# Patient Record
Sex: MALE | Race: WHITE | URBAN - METROPOLITAN AREA
[De-identification: names, ages, dates, MRNs, and addresses within clinical notes are randomized per-mention and may not be internally consistent; named-entity substitution may affect disease eponyms.]

---

## 2017-01-17 ENCOUNTER — OFFICE VISIT (OUTPATIENT)
Dept: INTERNAL MEDICINE CLINIC | Age: 42
End: 2017-01-17

## 2017-01-17 VITALS
WEIGHT: 210.9 LBS | DIASTOLIC BLOOD PRESSURE: 80 MMHG | TEMPERATURE: 96.9 F | OXYGEN SATURATION: 98 % | RESPIRATION RATE: 16 BRPM | SYSTOLIC BLOOD PRESSURE: 110 MMHG | HEART RATE: 57 BPM | BODY MASS INDEX: 31.24 KG/M2 | HEIGHT: 69 IN

## 2017-01-17 DIAGNOSIS — F90.9 ATTENTION DEFICIT HYPERACTIVITY DISORDER (ADHD), UNSPECIFIED ADHD TYPE: ICD-10-CM

## 2017-01-17 DIAGNOSIS — R07.9 CHEST PAIN, UNSPECIFIED TYPE: Primary | ICD-10-CM

## 2017-01-17 RX ORDER — DEXTROAMPHETAMINE SACCHARATE, AMPHETAMINE ASPARTATE, DEXTROAMPHETAMINE SULFATE AND AMPHETAMINE SULFATE 5; 5; 5; 5 MG/1; MG/1; MG/1; MG/1
20 TABLET ORAL DAILY
Qty: 30 TAB | Refills: 0 | Status: SHIPPED | OUTPATIENT
Start: 2017-01-17 | End: 2018-02-05

## 2017-01-17 RX ORDER — DEXTROAMPHETAMINE SACCHARATE, AMPHETAMINE ASPARTATE, DEXTROAMPHETAMINE SULFATE AND AMPHETAMINE SULFATE 5; 5; 5; 5 MG/1; MG/1; MG/1; MG/1
20 TABLET ORAL
COMMUNITY
End: 2017-01-17 | Stop reason: SDUPTHER

## 2017-01-17 NOTE — PROGRESS NOTES
Chief Complaint   Patient presents with    New Patient     Reviewed record in preparation for visit and have obtained necessary documentation. Identified pt with two pt identifiers(name and ). Health Maintenance Due   Topic    DTaP/Tdap/Td series (1 - Tdap)    INFLUENZA AGE 9 TO ADULT          Chief Complaint   Patient presents with    New Patient        Wt Readings from Last 3 Encounters:   17 210 lb 14.4 oz (95.7 kg)     Temp Readings from Last 3 Encounters:   17 96.9 °F (36.1 °C) (Oral)     BP Readings from Last 3 Encounters:   17 110/80     Pulse Readings from Last 3 Encounters:   17 (!) 57           Learning Assessment:  :     Learning Assessment 2017   PRIMARY LEARNER Patient   HIGHEST LEVEL OF EDUCATION - PRIMARY LEARNER  2 YEARS OF COLLEGE   BARRIERS PRIMARY LEARNER NONE   CO-LEARNER CAREGIVER No   PRIMARY LANGUAGE ENGLISH   LEARNER PREFERENCE PRIMARY VIDEOS     DEMONSTRATION   ANSWERED BY patient   RELATIONSHIP SELF       Depression Screening:  :     PHQ 2 / 9, over the last two weeks 2017   Little interest or pleasure in doing things Not at all   Feeling down, depressed or hopeless Not at all   Total Score PHQ 2 0       Fall Risk Assessment:  :     No flowsheet data found. Abuse Screening:  :     Abuse Screening Questionnaire 2017   Do you ever feel afraid of your partner? N   Are you in a relationship with someone who physically or mentally threatens you? N   Is it safe for you to go home?  Y       Coordination of Care Questionnaire:  :     1) Have you been to an emergency room, urgent care clinic since your last visit? no   Hospitalized since your last visit? no             2) Have you seen or consulted any other health care providers outside of [address] [place] [address] since your last visit? no  (Include any pap smears or colon screenings in this section.)    3) Do you have an Advance Directive on file? no    4) Are you interested in receiving information on Advance Directives? NO      Patient is accompanied by self I have received verbal consent from Alfred Vela III to discuss any/all medical information while they are present in the room. Reviewed record  In preparation for visit and have obtained necessary documentation.

## 2017-01-17 NOTE — MR AVS SNAPSHOT
Visit Information Date & Time Provider Department Dept. Phone Encounter #  
 1/17/2017 11:00 AM Mary Pollack MD [place] [address] [phone] Follow-up Instructions Return in about 6 weeks (around 2/28/2017) for F/U stress test. Upcoming Health Maintenance Date Due DTaP/Tdap/Td series (1 - Tdap) 5/27/1996 INFLUENZA AGE 9 TO ADULT 8/1/2016 Allergies as of 1/17/2017  Review Complete On: 1/17/2017 By: Mary Pollack MD  
  
 Severity Noted Reaction Type Reactions Bee Sting [Sting, Bee]  01/17/2017    Shortness of Breath, Swelling Current Immunizations  Never Reviewed No immunizations on file. Not reviewed this visit You Were Diagnosed With   
  
 Codes Comments Chest pain, unspecified type    -  Primary ICD-10-CM: R07.9 ICD-9-CM: 786.50 Attention deficit hyperactivity disorder (ADHD), unspecified ADHD type     ICD-10-CM: F90.9 ICD-9-CM: 314.01 Vitals BP Pulse Temp Resp Height(growth percentile) Weight(growth percentile) 110/80 (BP 1 Location: Left arm, BP Patient Position: Sitting) (!) 57 96.9 °F (36.1 °C) (Oral) 16 5' 9\" (1.753 m) 210 lb 14.4 oz (95.7 kg) SpO2 BMI Smoking Status 98% 31.14 kg/m2 Light Tobacco Smoker Vitals History BMI and BSA Data Body Mass Index Body Surface Area  
 31.14 kg/m 2 2.16 m 2 Preferred Pharmacy Pharmacy Name Phone [address] [place] [address] [phone] Your Updated Medication List  
  
   
This list is accurate as of: 1/17/17 11:42 AM.  Always use your most recent med list.  
  
  
  
  
 dextroamphetamine-amphetamine 20 mg tablet Commonly known as:  ADDERALL Take 1 Tab (20 mg total) by mouth dailyIndications: ATTENTION-DEFICIT HYPERACTIVITY DISORDER. Max Daily Amount: 20 mg  
  
  
  
  
Prescriptions Printed  Refills  
 dextroamphetamine-amphetamine (ADDERALL) 20 mg tablet 0  
 Sig: Take 1 Tab (20 mg total) by mouth dailyIndications: ATTENTION-DEFICIT HYPERACTIVITY DISORDER. Max Daily Amount: 20 mg  
 Class: Print Route: Oral  
  
We Performed the Following AMB POC EKG ROUTINE W/ 12 LEADS, INTER & REP [57589 CPT(R)] CRP, HIGH SENSITIVITY [26933 CPT(R)] LIPID PANEL [19624 CPT(R)] Follow-up Instructions Return in about 6 weeks (around 2/28/2017) for F/U stress test.  
  
To-Do List   
 01/18/2017 ECG:  STRESS TEST CARDIAC Patient Instructions Have a cardiac stress test done. Start taking aspirin 81 mg once daily. Have fasting blood work analysis. Follow a Mediterranean style diet. Try to lose 15 pounds over the next 4 months. Avoid smoking altogether. Mediterranean Diet: Care Instructions Your Care Instructions The Mediterranean diet features foods eaten in [place], [place], [place] and [place], and other countries that border the [facility]. It emphasizes eating a diet rich in fruits, vegetables, nuts, and high-fiber grains, and limits meat, cheese, and sweets. The Mediterranean diet may: · Prevent heart disease and lower the risk of a heart attack or stroke. · Prevent type 2 diabetes. · Prevent Alzheimer's disease and other dementia. · Prevent depression. · Prevent Parkinson's disease. This diet contains more fat than other heart-healthy diets. But the fats are mainly from nuts, unsaturated oils, such as fish oils, olive oil, and certain nut or seed oils (such as canola, soybean, or flaxseed oil). These types of oils may help protect the heart and blood vessels. Follow-up care is a key part of your treatment and safety. Be sure to make and go to all appointments, and call your doctor if you are having problems. It's also a good idea to know your test results and keep a list of the medicines you take. How can you care for yourself at home? What to eat · Eat a variety of fruits and vegetables each day, such as grapes, blueberries, tomatoes, broccoli, peppers, figs, olives, spinach, eggplant, beans, lentils, and chickpeas. · Eat a variety of whole-grain foods each day, such as oats, brown rice, and whole wheat bread, pasta, and couscous. · Eat fish at least 2 times a week. Try tuna, salmon, mackerel, lake trout, herring, or sardines. · Eat moderate amounts of low-fat dairy products each day or weekly, such as milk, cheese, or yogurt. · Eat moderate amounts of poultry and eggs every 2 days or weekly. · Choose healthy (unsaturated) fats, such as nuts, olive oil and certain nut or seed oils like canola, soybean, and flaxseed. · Limit unhealthy (saturated) fats, such as butter, palm oil, and coconut oil. And limit fats found in animal products, such as meat and dairy products made with whole milk. Try to eat red meat only a few times a month in very small amounts. · Limit sweets and desserts to only a few times a week. This includes sugar-sweetened drinks like soda. The Mediterranean diet may also include red wine with your meal1 glass each day for women and up to 2 glasses a day for men. Tips for changing your diet · Dip bread in a mix of olive oil and fresh herbs instead of using butter. · Add avocado slices to your sandwich instead of villafana. · Have fish for lunch or dinner instead of red meat. Brush the fish with olive oil, and broil or grill it. · Sprinkle your salad with seeds or nuts instead of cheese. · Cook with olive or canola oil instead of butter or oils that are high in saturated fat. · Switch from 2% milk or whole milk to 1% or fat-free milk. · Dip raw vegetables in a vinaigrette dressing or hummus instead of dips made from mayonnaise or sour cream. 
· Have a piece of fruit for dessert instead of a piece of cake. Try baked apples, or have some dried fruit. Part of the Mediterranean diet is being active.  Get at least 30 minutes of exercise on most days of the week. Walking is a good choice. You also may want to do other activities, such as running, swimming, cycling, or playing tennis or team sports. Where can you learn more? Go to http://dayton-yelitza.info/. Enter O407 in the search box to learn more about \"Mediterranean Diet: Care Instructions. \" Current as of: July 26, 2016 Content Version: 11.1 © 8504-7621 [employer]. Care instructions adapted under license by [employer] (which disclaims liability or warranty for this information). If you have questions about a medical condition or this instruction, always ask your healthcare professional. [address] any warranty or liability for your use of this information. Introducing [facility]! Feli Potts introduces [employer] patient portal. Now you can access parts of your medical record, email your doctor's office, and request medication refills online. 1. In your internet browser, go to [email or web address] 2. Click on the First Time User? Click Here link in the Sign In box. You will see the New Member Sign Up page. 3. Enter your bright boxt Access Code exactly as it appears below. You will not need to use this code after youve completed the sign-up process. If you do not sign up before the expiration date, you must request a new code. · [employer] Access Code: [facility] Expires: 4/17/2017 11:42 AM 
 
4. Enter the last four digits of your Social Security Number (xxxx) and Date of Birth (mm/dd/yyyy) as indicated and click Submit. You will be taken to the next sign-up page. 5. Create a bright boxt ID. This will be your [employer] login ID and cannot be changed, so think of one that is secure and easy to remember. 6. Create a [employer] password. You can change your password at any time. 7. Enter your Password Reset Question and Answer.  This can be used at a later time if you forget your password. 8. Enter your e-mail address. You will receive e-mail notification when new information is available in [address] 9. Click Sign Up. You can now view and download portions of your medical record. 10. Click the Download Summary menu link to download a portable copy of your medical information. If you have questions, please visit the Frequently Asked Questions section of the [employer] website. Remember, [employer] is NOT to be used for urgent needs. For medical emergencies, dial 911. Now available from your iPhone and Android! Please provide this summary of care documentation to your next provider. Your primary care clinician is listed as Polly Hou. If you have any questions after today's visit, please call [phone].

## 2017-01-17 NOTE — PROGRESS NOTES
Subjective:     Chief Complaint   Patient presents with    New Patient     He  is a 39y.o. year old male who presents for evaluation. Has moved back to [place]. Heart disease runs in family and has concerns. Has some heart burn that is new. FH:  GF in early 42's and uncle in early 42's had MI's. SH: Social smoker. Historical Data    Past Medical History   Diagnosis Date    ADHD (attention deficit hyperactivity disorder)     Asthma         History reviewed. No pertinent past surgical history. Outpatient Encounter Prescriptions as of 1/17/2017   Medication Sig Dispense Refill    dextroamphetamine-amphetamine (ADDERALL) 20 mg tablet Take 20 mg by mouth. No facility-administered encounter medications on file as of 1/17/2017. Allergies   Allergen Reactions    Bee Sting [Sting, Bee] Shortness of Breath and Swelling        Social History     Social History    Marital status:      Spouse name: N/A    Number of children: N/A    Years of education: N/A     Occupational History    Not on file. Social History Main Topics    Smoking status: Light Tobacco Smoker     Types: Cigarettes    Smokeless tobacco: Never Used    Alcohol use Not on file      Comment: socially    Drug use: No    Sexual activity: Yes     Partners: Female     Other Topics Concern    Not on file     Social History Narrative    No narrative on file        Review of Systems  Pertinent items are noted in HPI. Objective:     Vitals:    01/17/17 1112   BP: 110/80   Pulse: (!) 57   Resp: 16   Temp: 96.9 °F (36.1 °C)   TempSrc: Oral   SpO2: 98%   Weight: 210 lb 14.4 oz (95.7 kg)   Height: 5' 9\" (1.753 m)     Pleasant WM in no acute distress. Neck:  Supple. Cardiac:  RRR without murmurs, gallops or rubs. Lungs: Clear to auscultation. Abdomen:  Benign  EKG:  Sinus bradycardia    ASSESSMENT / PLAN:   1. Chest pain, unspecified type  · Atypical but strong FH.   · Refer for stress test.  · Lipid panel and CRP  · Start ASA 81 mg once daily. · Stop smoking. · Lose weight. · Mediterranean diet. - AMB POC EKG ROUTINE W/ 12 LEADS, INTER & REP  - STRESS TEST CARDIAC; Future  - LIPID PANEL  - CRP, HIGH SENSITIVITY  - REFERRAL TO CARDIOLOGY    2. Attention deficit hyperactivity disorder (ADHD), unspecified ADHD type    - dextroamphetamine-amphetamine (ADDERALL) 20 mg tablet; Take 1 Tab (20 mg total) by mouth dailyIndications: ATTENTION-DEFICIT HYPERACTIVITY DISORDER. Max Daily Amount: 20 mg  Dispense: 30 Tab; Refill: 0    Have a cardiac stress test done. Start taking aspirin 81 mg once daily. Have fasting blood work analysis. Follow a Mediterranean style diet. Try to lose 15 pounds over the next 4 months. Avoid smoking altogether. Follow-up Disposition:  Return in about 6 weeks (around 2/28/2017) for F/U stress test.   Advised him to call back or return to office if symptoms worsen/change/persist.  Discussed expected course/resolution/complications of diagnosis in detail with patient. Medication risks/benefits/costs/interactions/alternatives discussed with patient. He was given an after visit summary which includes diagnoses, current medications, & vitals. He expressed understanding with the diagnosis and plan.

## 2017-01-17 NOTE — PATIENT INSTRUCTIONS
Have a cardiac stress test done. Start taking aspirin 81 mg once daily. Have fasting blood work analysis. Follow a Mediterranean style diet. Try to lose 15 pounds over the next 4 months. Avoid smoking altogether. Mediterranean Diet: Care Instructions  Your Care Instructions  The Mediterranean diet features foods eaten in [place], [place], [place] and [place], and other countries that border the [facility]. It emphasizes eating a diet rich in fruits, vegetables, nuts, and high-fiber grains, and limits meat, cheese, and sweets. The Mediterranean diet may:  · Prevent heart disease and lower the risk of a heart attack or stroke. · Prevent type 2 diabetes. · Prevent Alzheimer's disease and other dementia. · Prevent depression. · Prevent Parkinson's disease. This diet contains more fat than other heart-healthy diets. But the fats are mainly from nuts, unsaturated oils, such as fish oils, olive oil, and certain nut or seed oils (such as canola, soybean, or flaxseed oil). These types of oils may help protect the heart and blood vessels. Follow-up care is a key part of your treatment and safety. Be sure to make and go to all appointments, and call your doctor if you are having problems. It's also a good idea to know your test results and keep a list of the medicines you take. How can you care for yourself at home? What to eat  · Eat a variety of fruits and vegetables each day, such as grapes, blueberries, tomatoes, broccoli, peppers, figs, olives, spinach, eggplant, beans, lentils, and chickpeas. · Eat a variety of whole-grain foods each day, such as oats, brown rice, and whole wheat bread, pasta, and couscous. · Eat fish at least 2 times a week. Try tuna, salmon, mackerel, lake trout, herring, or sardines. · Eat moderate amounts of low-fat dairy products each day or weekly, such as milk, cheese, or yogurt. · Eat moderate amounts of poultry and eggs every 2 days or weekly.   · Choose healthy (unsaturated) fats, such as nuts, olive oil and certain nut or seed oils like canola, soybean, and flaxseed. · Limit unhealthy (saturated) fats, such as butter, palm oil, and coconut oil. And limit fats found in animal products, such as meat and dairy products made with whole milk. Try to eat red meat only a few times a month in very small amounts. · Limit sweets and desserts to only a few times a week. This includes sugar-sweetened drinks like soda. The Mediterranean diet may also include red wine with your meal1 glass each day for women and up to 2 glasses a day for men. Tips for changing your diet  · Dip bread in a mix of olive oil and fresh herbs instead of using butter. · Add avocado slices to your sandwich instead of villafana. · Have fish for lunch or dinner instead of red meat. Brush the fish with olive oil, and broil or grill it. · Sprinkle your salad with seeds or nuts instead of cheese. · Cook with olive or canola oil instead of butter or oils that are high in saturated fat. · Switch from 2% milk or whole milk to 1% or fat-free milk. · Dip raw vegetables in a vinaigrette dressing or hummus instead of dips made from mayonnaise or sour cream.  · Have a piece of fruit for dessert instead of a piece of cake. Try baked apples, or have some dried fruit. Part of the Mediterranean diet is being active. Get at least 30 minutes of exercise on most days of the week. Walking is a good choice. You also may want to do other activities, such as running, swimming, cycling, or playing tennis or team sports. Where can you learn more? Go to http://dayton-yelitza.info/. Enter O407 in the search box to learn more about \"Mediterranean Diet: Care Instructions. \"  Current as of: July 26, 2016  Content Version: 11.1  © 7332-0446 [employer]. Care instructions adapted under license by [employer] (which disclaims liability or warranty for this information).  If you have questions about a medical condition or this instruction, always ask your healthcare professional. [place] [address] any warranty or liability for your use of this information.

## 2017-01-18 DIAGNOSIS — R07.9 CHEST PAIN, UNSPECIFIED TYPE: Primary | ICD-10-CM

## 2017-01-18 LAB
CHOLEST SERPL-MCNC: 179 MG/DL (ref 100–199)
CRP SERPL HS-MCNC: 0.8 MG/L (ref 0–3)
HDLC SERPL-MCNC: 62 MG/DL
INTERPRETATION, 910389: NORMAL
LDLC SERPL CALC-MCNC: 102 MG/DL (ref 0–99)
TRIGL SERPL-MCNC: 77 MG/DL (ref 0–149)
VLDLC SERPL CALC-MCNC: 15 MG/DL (ref 5–40)

## 2017-01-27 ENCOUNTER — CLINICAL SUPPORT (OUTPATIENT)
Dept: CARDIOLOGY CLINIC | Age: 42
End: 2017-01-27

## 2017-01-27 ENCOUNTER — OFFICE VISIT (OUTPATIENT)
Dept: CARDIOLOGY CLINIC | Age: 42
End: 2017-01-27

## 2017-01-27 VITALS
WEIGHT: 209 LBS | BODY MASS INDEX: 30.96 KG/M2 | SYSTOLIC BLOOD PRESSURE: 110 MMHG | DIASTOLIC BLOOD PRESSURE: 82 MMHG | HEIGHT: 69 IN | HEART RATE: 62 BPM

## 2017-01-27 DIAGNOSIS — R07.9 CHEST PAIN, UNSPECIFIED TYPE: ICD-10-CM

## 2017-01-27 DIAGNOSIS — R07.89 OTHER CHEST PAIN: Primary | ICD-10-CM

## 2017-01-27 DIAGNOSIS — E78.5 DYSLIPIDEMIA: Primary | ICD-10-CM

## 2017-01-27 NOTE — MR AVS SNAPSHOT
Visit Information Date & Time Provider Department Dept. Phone Encounter #  
 1/27/2017  1:00 PM Amy Ramon MD [facility] [phone] [id number] Your Appointments 2/28/2017 10:15 AM  
ROUTINE CARE with MD Maik Maurer [facility]) Appt Note: 6weeks fup  stress test  
 [address], [address] [place] [address]  
[address], [place] [address] [address] Upcoming Health Maintenance Date Due Pneumococcal 19-64 Medium Risk (1 of 1 - PPSV23) 5/27/1994 DTaP/Tdap/Td series (1 - Tdap) 2/28/2017* INFLUENZA AGE 9 TO ADULT 2/28/2017* *Topic was postponed. The date shown is not the original due date. Allergies as of 1/27/2017  Review Complete On: 1/27/2017 By: Nathen Porter RN Severity Noted Reaction Type Reactions Bee Sting [Sting, Bee]  01/17/2017    Shortness of Breath, Swelling Current Immunizations  Never Reviewed No immunizations on file. Not reviewed this visit You Were Diagnosed With   
  
 Codes Comments Dyslipidemia    -  Primary ICD-10-CM: E78.5 ICD-9-CM: 272.4 Chest pain, unspecified type     ICD-10-CM: R07.9 ICD-9-CM: 786.50 Vitals BP Pulse Height(growth percentile) Weight(growth percentile) BMI Smoking Status 110/82 62 5' 9\" (1.753 m) 209 lb (94.8 kg) 30.86 kg/m2 Light Tobacco Smoker Vitals History BMI and BSA Data Body Mass Index Body Surface Area  
 30.86 kg/m 2 2.15 m 2 Preferred Pharmacy Pharmacy Name Phone [facility] 48 [phone] Your Updated Medication List  
  
   
This list is accurate as of: 1/27/17  2:24 PM.  Always use your most recent med list.  
  
  
  
  
 * dextroamphetamine-amphetamine 20 mg tablet Commonly known as:  ADDERALL Take 1 Tab by mouth daily. * dextroamphetamine-amphetamine 20 mg tablet Commonly known as:  ADDERALL Take 1 Tab (20 mg total) by mouth dailyIndications: ATTENTION-DEFICIT HYPERACTIVITY DISORDER. Max Daily Amount: 20 mg  
  
 * Notice: This list has 2 medication(s) that are the same as other medications prescribed for you. Read the directions carefully, and ask your doctor or other care provider to review them with you. We Performed the Following METABOLIC PANEL, COMPREHENSIVE [52334 CPT(R)] NMR LIPOPROFILE [id number] CPT(R)] Introducing [facility]! Romayne Duster introduces [email or web address] patient portal. Now you can access parts of your medical record, email your doctor's office, and request medication refills online. 1. In your internet browser, go to [email or web address] 2. Click on the First Time User? Click Here link in the Sign In box. You will see the New Member Sign Up page. 3. Enter your [email or web address] Access Code exactly as it appears below. You will not need to use this code after youve completed the sign-up process. If you do not sign up before the expiration date, you must request a new code. · [email or web address] Access Code: [facility] Expires: 4/17/2017 11:42 AM 
 
4. Enter the last four digits of your Social Security Number (xxxx) and Date of Birth (mm/dd/yyyy) as indicated and click Submit. You will be taken to the next sign-up page. 5. Create a [employer] ID. This will be your [email or web address] login ID and cannot be changed, so think of one that is secure and easy to remember. 6. Create a [email or web address] password. You can change your password at any time. 7. Enter your Password Reset Question and Answer. This can be used at a later time if you forget your password. 8. Enter your e-mail address. You will receive e-mail notification when new information is available in [address] 9. Click Sign Up. You can now view and download portions of your medical record. 10. Click the Download Summary menu link to download a portable copy of your medical information. If you have questions, please visit the Frequently Asked Questions section of the [employer] website. Remember, [employer] is NOT to be used for urgent needs. For medical emergencies, dial 911. Now available from your iPhone and Android! Please provide this summary of care documentation to your next provider. Your primary care clinician is listed as Thompson Shrestha. If you have any questions after today's visit, please call [phone].

## 2017-01-27 NOTE — PROGRESS NOTES
[place]: Isai Barrett  [phone]  Requesting/referring provider:   Reason for Consult:    HPI: Patricia Martinez, a 39y.o. year-old who presents for evaluation of chest pain and family history of CAD. He is concerned about his family history of early cad. Also had some friends who had trouble. He had some heartburn, his trae had heartburn and it ended up being an MI. 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 Eating healthier, stress is under control    Assessment/Plan:  1. Chest pain- stress test today normal, exe 12:00  2. ADHD on adderall, trying to wean it off  3. Body mass index is 30.86 kg/(m^2). working on diet and exercise  4. Tobacco use - counseled on cessation  5. CAD risks- dicussed calcium score, aspirin and statin, no meds for now unless plaque on scan. Fhx + CAD at 36 with uncle and GF  Soc + tob, occ etoh, has a [employer] business in [place] and a [employer] business here  He  has a past medical history of ADHD (attention deficit hyperactivity disorder) and Asthma. Cardiovascular ROS: positive for - chest pain  Respiratory ROS: no cough, shortness of breath, or wheezing  Neurological ROS: no TIA or stroke symptoms  All other systems negative except as above. PE  Vitals:    01/27/17 1335   BP: 110/82   Pulse: 62   Weight: 209 lb (94.8 kg)   Height: 5' 9\" (1.753 m)    Body mass index is 30.86 kg/(m^2).    General appearance - alert, well appearing, and in no distress  Mental status - affect appropriate to mood  Eyes - sclera anicteric, moist mucous membranes  Neck - supple, no significant adenopathy  Lymphatics - no  lymphadenopathy  Chest - clear to auscultation, no wheezes, rales or rhonchi  Heart - normal rate, regular rhythm, normal S1, S2, no murmurs, rubs, clicks or gallops  Abdomen - soft, nontender, nondistended, no masses or organomegaly  Back exam - full range of motion, no tenderness  Neurological - cranial nerves II through XII grossly intact, no focal deficit  Musculoskeletal - no muscular tenderness noted, normal strength  Extremities - peripheral pulses normal, no pedal edema  Skin - normal coloration  no rashes    Recent Labs:  Lab Results   Component Value Date/Time    Cholesterol, total 179 01/17/2017 01:08 PM    HDL Cholesterol 62 01/17/2017 01:08 PM    LDL, calculated 102 01/17/2017 01:08 PM    Triglyceride 77 01/17/2017 01:08 PM    CHOL/HDL Ratio 2.7 01/21/2010 10:09 AM     Lab Results   Component Value Date/Time    Creatinine 1.1 01/21/2010 10:09 AM     Lab Results   Component Value Date/Time    BUN 16 01/21/2010 10:09 AM     Lab Results   Component Value Date/Time    Potassium 4.1 01/21/2010 10:09 AM     No results found for: HBA1C, HGBE8, OZM2QOWF  Lab Results   Component Value Date/Time    HGB 14.9 01/21/2010 10:09 AM     Lab Results   Component Value Date/Time    PLATELET 588 93/96/7864 10:09 AM       Reviewed:  Past Medical History   Diagnosis Date    ADHD (attention deficit hyperactivity disorder)     Asthma      History   Smoking Status    Light Tobacco Smoker    Types: Cigarettes   Smokeless Tobacco    Never Used     History   Alcohol use Not on file     Comment: socially     Allergies   Allergen Reactions    Bee Sting [Sting, Bee] Shortness of Breath and Swelling       Current Outpatient Prescriptions   Medication Sig    amphetamine-dextroamphetamine (ADDERALL) 20 mg tablet Take 1 Tab by mouth daily.  dextroamphetamine-amphetamine (ADDERALL) 20 mg tablet Take 1 Tab (20 mg total) by mouth dailyIndications: ATTENTION-DEFICIT HYPERACTIVITY DISORDER. Max Daily Amount: 20 mg     No current facility-administered medications for this visit.         Bisi Corral MD  [facility] heart and [facility] [place]  [address] [facility] [address]  [facility], [address]

## 2018-02-05 ENCOUNTER — OFFICE VISIT (OUTPATIENT)
Dept: CARDIOLOGY CLINIC | Age: 43
End: 2018-02-05

## 2018-02-05 VITALS
WEIGHT: 215.3 LBS | BODY MASS INDEX: 31.89 KG/M2 | DIASTOLIC BLOOD PRESSURE: 70 MMHG | HEIGHT: 69 IN | SYSTOLIC BLOOD PRESSURE: 110 MMHG

## 2018-02-05 DIAGNOSIS — R07.9 CHEST PAIN, UNSPECIFIED TYPE: Primary | ICD-10-CM

## 2018-02-05 NOTE — PROGRESS NOTES
Cardiology Office Progress Note            Patient: Trey Paredes III  Diagnosis:     ICD-10-CM ICD-9-CM    1. Chest pain, unspecified type R07.9 786.50 AMB POC EKG ROUTINE W/ 12 LEADS, INTER & REP      LIPID PANEL      THYROID PANEL W/TSH      C REACTIVE PROTEIN, QT     Date: 2/5/2018     Time: 2:49 PM     Assessment and Plan     1. Chest pain - atypical - occurred once or twice   - reports diffuse over chest.  Non radiating. Occurred while sitting. Lasting a few minutes   - No associated SOB, diaphoresis or N/V   - Stress echo 1 year ago negative  2. ADHD    - off Adderall  3. Body mass index is Body mass index is 31.79 kg/(m^2). - Obese. He admits to eating poorly and not being as active  4. Tobacco use - counseled on cessation as he continues to smoke socially  5. CAD risks - Fmhx with grandfather and uncle. Both parents alive and without CAD. HTN and HLD with his father. Mother breast cancer survivor   - Discussed risk factors (remote FmHx, smoking, poor diet and exercise habits, male)   - Discussed repeating stress test vs. Calcium score. He would like to complete calcium scoring.    - Will review results once obtained and start statin is elevated. - Repeat lipid profile and TSH (sister recently dx with hypothyroid. Patient feeling more lethargic and less energy)    Information given for calcium score. Lab slip given for lipid profile, TSH. Discussed the need to change his dietary habits. Adding more fruits and vegetables, consuming proteins that are lean and avoiding fast foods and quick foods. He recently , so admits to living the bachelor lifestyle. Recommended he use services like [employer] to shop on line and . Keeping quality foods on hand instead of stopping for fast foods. IF he finds himself at [employer], then pick the healthier menu options.   Also discussed to ease back into exercise. He used to be an extreme skier and kept in shape. He is off his Adderall and has gained weight. Asked he start with 20 minutes of aerobic exercise 4-5 times per week and increase his activity from there. Will review results of Calcium score and lipid profile once complete. reviewed diet, exercise and weight control  very strongly urged to quit smoking to reduce cardiovascular risk       Patient Active Problem List   Diagnosis Code    Attention deficit hyperactivity disorder (ADHD) F90.9        Subjective:   Manny Picking III recounts having vague chest pain sometime last week. Diffuse and non radiating. More pressure like and occurred at rest while watching TV. He is vague as to how long it lasted, but short lived. No associated symptoms with it. Resolved on its own. He admits he is focused on CAD with distant family history. ROS:  A comprehensive review of systems was negative except for that written in the HPI. Objective:      Physical Exam:                Visit Vitals    /70 (BP 1 Location: Left arm, BP Patient Position: Sitting)    Ht 5' 9\" (1.753 m)    Wt 215 lb 4.8 oz (97.7 kg)    BMI 31.79 kg/m2        General Appearance:   Well developed, well nourished,alert and oriented x 3, and   individual in no acute distress. Ears/Nose/Mouth/Throat:    Hearing grossly normal.         Neck:  Supple. Chest:    Lungs clear to auscultation bilaterally. Cardiovascular:   Regular rate and rhythm, S1, S2 normal, no murmur. Abdomen:    Soft, non-tender, bowel sounds are active. Extremities:  No edema bilaterally. Skin:  Warm and dry. Data Review:    Labs:  No results found for this or any previous visit (from the past 24 hour(s)). Radiology:        Current Outpatient Prescriptions   Medication Sig    dextroamphetamine-amphetamine (ADDERALL) 20 mg tablet Take 1 Tab (20 mg total) by mouth dailyIndications: ATTENTION-DEFICIT HYPERACTIVITY DISORDER.   Max Daily Amount: 20 mg    amphetamine-dextroamphetamine (ADDERALL) 20 mg tablet Take 1 Tab by mouth daily. No current facility-administered medications for this visit.            Beverly Bower PA-C     [facility] [address] [place] [address], [address] [place] [address],[address]   [facility]   [phone]

## 2018-02-05 NOTE — MR AVS SNAPSHOT
[address] [facility] [address] [facility] [address] 
[phone] Patient: Sagar Avelar 
MRN: [id number] :1975 Visit Information Date & Time Provider Department Dept. Phone Encounter #  
 2018  2:00 PM Darryl Hernandez PA-C [facility] [phone] [id number] Upcoming Health Maintenance Date Due Pneumococcal 19-64 Medium Risk (1 of 1 - PPSV23) 1994 DTaP/Tdap/Td series (1 - Tdap) 1996 Influenza Age 5 to Adult 2017 Allergies as of 2018  Review Complete On: 2018 By: Darryl Hernandez PA-C Severity Noted Reaction Type Reactions Bee Sting [Sting, Bee]  2017    Shortness of Breath, Swelling Current Immunizations  Never Reviewed No immunizations on file. Not reviewed this visit You Were Diagnosed With   
  
 Codes Comments Chest pain, unspecified type    -  Primary ICD-10-CM: R07.9 ICD-9-CM: 786.50 Vitals BP Height(growth percentile) Weight(growth percentile) BMI Smoking Status 110/70 (BP 1 Location: Left arm, BP Patient Position: Sitting) 5' 9\" (1.753 m) 215 lb 4.8 oz (97.7 kg) 31.79 kg/m2 Light Tobacco Smoker Vitals History BMI and BSA Data Body Mass Index Body Surface Area 31.79 kg/m 2 2.18 m 2 Preferred Pharmacy Pharmacy Name Phone [address] [place] [address] [phone] Your Updated Medication List  
  
Notice  As of 2018  2:50 PM  
 You have not been prescribed any medications. We Performed the Following AMB POC EKG ROUTINE W/ 12 LEADS, INTER & REP [66868 CPT(R)] C REACTIVE PROTEIN, QT [19846 CPT(R)] LIPID PANEL [10194 CPT(R)] THYROID PANEL W/TSH [78814 CPT(R)] Introducing [facility]!    
 [facility] introduces M[employer] patient portal. Now you can access parts of your medical record, email your doctor's office, and request medication refills online. 1. In your internet browser, go to [email or web address] 2. Click on the First Time User? Click Here link in the Sign In box. You will see the New Member Sign Up page. 3. Enter your Sidecar Access Code exactly as it appears below. You will not need to use this code after youve completed the sign-up process. If you do not sign up before the expiration date, you must request a new code. · Sidecar Access Code: [email or web address] Expires: 5/6/2018  2:50 PM 
 
4. Enter the last four digits of your Social Security Number (xxxx) and Date of Birth (mm/dd/yyyy) as indicated and click Submit. You will be taken to the next sign-up page. 5. Create a Sidecar ID. This will be your Sidecar login ID and cannot be changed, so think of one that is secure and easy to remember. 6. Create a Sidecar password. You can change your password at any time. 7. Enter your Password Reset Question and Answer. This can be used at a later time if you forget your password. 8. Enter your e-mail address. You will receive e-mail notification when new information is available in [address] 9. Click Sign Up. You can now view and download portions of your medical record. 10. Click the Download Summary menu link to download a portable copy of your medical information. If you have questions, please visit the Frequently Asked Questions section of the Sidecar website. Remember, Sidecar is NOT to be used for urgent needs. For medical emergencies, dial 911. Now available from your iPhone and Android! Please provide this summary of care documentation to your next provider. Your primary care clinician is listed as Catarina Saeed. If you have any questions after today's visit, please call [phone].

## 2018-02-06 LAB
CHOLEST SERPL-MCNC: 172 MG/DL (ref 100–199)
CRP SERPL-MCNC: 2.5 MG/L (ref 0–4.9)
FT4I SERPL CALC-MCNC: 1.6 (ref 1.2–4.9)
HDLC SERPL-MCNC: 50 MG/DL
INTERPRETATION, 910389: NORMAL
LDLC SERPL CALC-MCNC: 71 MG/DL (ref 0–99)
T3RU NFR SERPL: 25 % (ref 24–39)
T4 SERPL-MCNC: 6.3 UG/DL (ref 4.5–12)
TRIGL SERPL-MCNC: 253 MG/DL (ref 0–149)
TSH SERPL DL<=0.005 MIU/L-ACNC: 1.46 UIU/ML (ref 0.45–4.5)
VLDLC SERPL CALC-MCNC: 51 MG/DL (ref 5–40)

## 2018-04-11 ENCOUNTER — OFFICE VISIT (OUTPATIENT)
Dept: INTERNAL MEDICINE CLINIC | Age: 43
End: 2018-04-11

## 2018-04-11 VITALS
DIASTOLIC BLOOD PRESSURE: 70 MMHG | SYSTOLIC BLOOD PRESSURE: 100 MMHG | RESPIRATION RATE: 16 BRPM | OXYGEN SATURATION: 97 % | BODY MASS INDEX: 31.59 KG/M2 | TEMPERATURE: 98.6 F | HEIGHT: 69 IN | WEIGHT: 213.3 LBS | HEART RATE: 58 BPM

## 2018-04-11 DIAGNOSIS — F90.0 ATTENTION DEFICIT HYPERACTIVITY DISORDER (ADHD), PREDOMINANTLY INATTENTIVE TYPE: Primary | ICD-10-CM

## 2018-04-11 DIAGNOSIS — L98.9 SKIN LESION OF CHEEK: ICD-10-CM

## 2018-04-11 DIAGNOSIS — Z79.899 BENZODIAZEPINE CONTRACT EXISTS: ICD-10-CM

## 2018-04-11 DIAGNOSIS — Z79.899 CONTROLLED SUBSTANCE AGREEMENT SIGNED: ICD-10-CM

## 2018-04-11 RX ORDER — DEXTROAMPHETAMINE SACCHARATE, AMPHETAMINE ASPARTATE, DEXTROAMPHETAMINE SULFATE AND AMPHETAMINE SULFATE 5; 5; 5; 5 MG/1; MG/1; MG/1; MG/1
20 TABLET ORAL DAILY
Qty: 30 TAB | Refills: 0 | Status: SHIPPED | OUTPATIENT
Start: 2018-04-11 | End: 2018-06-04 | Stop reason: SDUPTHER

## 2018-04-11 NOTE — MR AVS SNAPSHOT
[address] [facility], [address] [place] [address] 
[phone] Patient: Tod Carver 
MRN: [id number] :1975 Visit Information Date & Time Provider Department Dept. Phone Encounter #  
 2018  1:15 PM Josias Shirley MD [place] [address] [facility] [phone] [id number] Follow-up Instructions Return in about 4 months (around 2018) for Medication re-evaluation. Upcoming Health Maintenance Date Due Pneumococcal 19-64 Medium Risk (1 of 1 - PPSV23) 1994 DTaP/Tdap/Td series (1 - Tdap) 1996 Influenza Age 5 to Adult 2018* *Topic was postponed. The date shown is not the original due date. Allergies as of 2018  Review Complete On: 2018 By: Josias Shirley MD  
  
 Severity Noted Reaction Type Reactions Bee Sting [Sting, Bee]  2017    Shortness of Breath, Swelling Current Immunizations  Never Reviewed No immunizations on file. Not reviewed this visit You Were Diagnosed With   
  
 Codes Comments Attention deficit hyperactivity disorder (ADHD), predominantly inattentive type    -  Primary ICD-10-CM: F90.0 ICD-9-CM: 314.00 Skin lesion of cheek     ICD-10-CM: L98.9 ICD-9-CM: 709.9 Vitals BP Pulse Temp Resp Height(growth percentile) Weight(growth percentile) 100/70 (BP 1 Location: Left arm, BP Patient Position: Sitting) (!) 58 98.6 °F (37 °C) (Oral) 16 5' 9\" (1.753 m) 213 lb 4.8 oz (96.8 kg) SpO2 BMI Smoking Status 97% 31.5 kg/m2 Light Tobacco Smoker Vitals History BMI and BSA Data Body Mass Index Body Surface Area 31.5 kg/m 2 2.17 m 2 Preferred Pharmacy Pharmacy Name Phone [address] [place] [address] [phone] Your Updated Medication List  
  
   
This list is accurate as of 18  2:00 PM.  Always use your most recent med list.  
  
  
  
  
 dextroamphetamine-amphetamine 20 mg tablet Commonly known as:  ADDERALL Take 1 Tab (20 mg total) by mouth daily. Max Daily Amount: 20 mg  
  
  
  
  
Prescriptions Printed Refills  
 dextroamphetamine-amphetamine (ADDERALL) 20 mg tablet 0 Sig: Take 1 Tab (20 mg total) by mouth daily. Max Daily Amount: 20 mg  
 Class: Print Route: Oral  
  
We Performed the Following REFERRAL TO DERMATOLOGY [REF19 Custom] Follow-up Instructions Return in about 4 months (around 8/11/2018) for Medication re-evaluation. Referral Information Referral ID Referred By Referred To  
  
 [id number] [facility] [address] [facility] Phone: [phone] Fax: [phone] Visits Status Start Date End Date 1 New Request 4/11/18 4/11/19 If your referral has a status of pending review or denied, additional information will be sent to support the outcome of this decision. Patient Instructions See Dermatology. Adderall is available for  on a monthly basis. Contract terms to be followed. Introducing [address]! [facility] introduces ConjuGon patient portal. Now you can access parts of your medical record, email your doctor's office, and request medication refills online. 1. In your internet browser, go to [email or web address] 2. Click on the First Time User? Click Here link in the Sign In box. You will see the New Member Sign Up page. 3. Enter your ConjuGon Access Code exactly as it appears below. You will not need to use this code after youve completed the sign-up process. If you do not sign up before the expiration date, you must request a new code. · ConjuGon Access Code: [email or web address] Expires: 5/6/2018  3:50 PM 
 
4. Enter the last four digits of your Social Security Number (xxxx) and Date of Birth (mm/dd/yyyy) as indicated and click Submit.  You will be taken to the next sign-up page. 5. Create a [employer] ID. This will be your [employer] login ID and cannot be changed, so think of one that is secure and easy to remember. 6. Create a [employer] password. You can change your password at any time. 7. Enter your Password Reset Question and Answer. This can be used at a later time if you forget your password. 8. Enter your e-mail address. You will receive e-mail notification when new information is available in [address] 9. Click Sign Up. You can now view and download portions of your medical record. 10. Click the Download Summary menu link to download a portable copy of your medical information. If you have questions, please visit the Frequently Asked Questions section of the [employer] website. Remember, [employer] is NOT to be used for urgent needs. For medical emergencies, dial 911. Now available from your iPhone and Android! Please provide this summary of care documentation to your next provider. Your primary care clinician is listed as Yaneth Rogers. If you have any questions after today's visit, please call [phone].

## 2018-04-11 NOTE — PROGRESS NOTES
Subjective:     Chief Complaint   Patient presents with    Medication Evaluation     He  is a 43y.o. year old male who presents for evaluation. Moved back to [place] from [facility]. Has a spot under eye that he thought was a pimple. Has history of Adderall use but asked to get off of it but feels that he needs back on it. Was tested at [address]. Was taking Adderall 20 mg once a day. Historical Data    Past Medical History:   Diagnosis Date    ADHD (attention deficit hyperactivity disorder)     Asthma         No past surgical history on file. No outpatient encounter prescriptions on file as of 4/11/2018. No facility-administered encounter medications on file as of 4/11/2018. Allergies   Allergen Reactions    Bee Sting [Sting, Bee] Shortness of Breath and Swelling        Social History     Social History    Marital status:      Spouse name: N/A    Number of children: N/A    Years of education: N/A     Occupational History    Not on file. Social History Main Topics    Smoking status: Light Tobacco Smoker     Types: Cigarettes    Smokeless tobacco: Never Used    Alcohol use Not on file      Comment: socially    Drug use: No    Sexual activity: Yes     Partners: Female     Other Topics Concern    Not on file     Social History Narrative      Review of Systems  Pertinent items are noted in HPI. Objective:     Vitals:    04/11/18 1335   BP: 100/70   Pulse: (!) 58   Resp: 16   Temp: 98.6 °F (37 °C)   TempSrc: Oral   SpO2: 97%   Weight: 213 lb 4.8 oz (96.8 kg)   Height: 5' 9\" (1.753 m)     Pleasant WM in no acute distress. Face:  Papule under left eye. Cardiac: RRR without murmurs, gallops or rubs. Lungs: Clear to auscultation. Abdomen: Benign    ASSESSMENT / PLAN:   1. Attention deficit hyperactivity disorder (ADHD), predominantly inattentive type  · Drug contract signed. · Start Adderall 20 mg once daily. - dextroamphetamine-amphetamine (ADDERALL) 20 mg tablet;  Take 1 Tab (20 mg total) by mouth daily. Max Daily Amount: 20 mg  Dispense: 30 Tab; Refill: 0    2. Skin lesion of cheek    - REFERRAL TO DERMATOLOGY             Follow-up Disposition:  Return in about 4 months (around 8/11/2018) for Medication re-evaluation. Advised him to call back or return to office if symptoms worsen/change/persist.  Discussed expected course/resolution/complications of diagnosis in detail with patient. Medication risks/benefits/costs/interactions/alternatives discussed with patient. He was given an after visit summary which includes diagnoses, current medications, & vitals. He expressed understanding with the diagnosis and plan.

## 2018-04-11 NOTE — PROGRESS NOTES
Chief Complaint   Patient presents with    Medication Evaluation     Reviewed record in preparation for visit and have obtained necessary documentation. Identified pt with two pt identifiers(name and ). Health Maintenance Due   Topic    Pneumococcal 19-64 Medium Risk (1 of 1 - PPSV23)    DTaP/Tdap/Td series (1 - Tdap)         Chief Complaint   Patient presents with    Medication Evaluation        Wt Readings from Last 3 Encounters:   18 213 lb 4.8 oz (96.8 kg)   18 215 lb 4.8 oz (97.7 kg)   17 209 lb (94.8 kg)     Temp Readings from Last 3 Encounters:   18 98.6 °F (37 °C) (Oral)   17 96.9 °F (36.1 °C) (Oral)     BP Readings from Last 3 Encounters:   18 100/70   18 110/70   17 110/82     Pulse Readings from Last 3 Encounters:   18 (!) 58   17 62   17 (!) 57           Learning Assessment:  :     Learning Assessment 2017   PRIMARY LEARNER Patient   HIGHEST LEVEL OF EDUCATION - PRIMARY LEARNER  2 YEARS OF COLLEGE   BARRIERS PRIMARY LEARNER NONE   CO-LEARNER CAREGIVER No   PRIMARY LANGUAGE ENGLISH   LEARNER PREFERENCE PRIMARY VIDEOS     DEMONSTRATION   ANSWERED BY patient   RELATIONSHIP SELF       Depression Screening:  :     PHQ over the last two weeks 2018   Little interest or pleasure in doing things Not at all   Feeling down, depressed or hopeless Not at all   Total Score PHQ 2 0       Fall Risk Assessment:  :     No flowsheet data found. Abuse Screening:  :     Abuse Screening Questionnaire 2017   Do you ever feel afraid of your partner? N   Are you in a relationship with someone who physically or mentally threatens you? N   Is it safe for you to go home?  Y       Coordination of Care Questionnaire:  :     1) Have you been to an emergency room, urgent care clinic since your last visit? no   Hospitalized since your last visit? no             2) Have you seen or consulted any other health care providers outside of [employer] [facility] [employer] [facility] since your last visit? no  (Include any pap smears or colon screenings in this section.)    3) Do you have an Advance Directive on file? no    4) Are you interested in receiving information on Advance Directives? NO      Patient is accompanied by self I have received verbal consent from Winston Sanchez III to discuss any/all medical information while they are present in the room. Reviewed record  In preparation for visit and have obtained necessary documentation.

## 2018-06-04 DIAGNOSIS — F90.0 ATTENTION DEFICIT HYPERACTIVITY DISORDER (ADHD), PREDOMINANTLY INATTENTIVE TYPE: ICD-10-CM

## 2018-06-04 NOTE — TELEPHONE ENCOUNTER
Requested Prescriptions     Pending Prescriptions Disp Refills    dextroamphetamine-amphetamine (ADDERALL) 20 mg tablet 30 Tab 0     Sig: Take 1 Tab (20 mg total) by mouth daily. Max Daily Amount: 20 mg     Last OV:04/11/18  Next OV:08/09/18    Patient states he is going out of town at 5 am tomorrow morning, he would like to pick this script up today.

## 2018-06-05 RX ORDER — DEXTROAMPHETAMINE SACCHARATE, AMPHETAMINE ASPARTATE, DEXTROAMPHETAMINE SULFATE AND AMPHETAMINE SULFATE 5; 5; 5; 5 MG/1; MG/1; MG/1; MG/1
20 TABLET ORAL DAILY
Qty: 30 TAB | Refills: 0 | Status: SHIPPED | OUTPATIENT
Start: 2018-06-05 | End: 2018-11-05

## 2018-06-06 NOTE — TELEPHONE ENCOUNTER
Attempted to contact patient to notify prescription is ready for . Left message on voicemail to return a call.

## 2018-09-12 ENCOUNTER — TELEPHONE (OUTPATIENT)
Dept: CARDIOLOGY CLINIC | Age: 43
End: 2018-09-12

## 2018-09-12 NOTE — TELEPHONE ENCOUNTER
Pt called in regards to scheduling an early appointment to see Dr. Hyacinth John. However dr. Hyacinth John schedule is fully booked until jan 2019. Please call patient to coordinate a sooner appointment. Pt stated he is experiencing chest tightness and he couldn't wait till her first available.    Phone: [phone]

## 2018-09-13 PROBLEM — R07.89 OTHER CHEST PAIN: Status: ACTIVE | Noted: 2018-09-13

## 2018-09-13 NOTE — TELEPHONE ENCOUNTER
Pt called to r/s appointment with Dr. Herb Simental due to an house emergency. Pt wanted to see if he could schedule the appointment sometime next week.    Phone# [phone]

## 2018-09-13 NOTE — TELEPHONE ENCOUNTER
Spoke with patient rescheduled 9/14/18 to 9/18/18 with Dr. Jerica Brock. Patient states pipe(s) have burst in home and needs to be there for contractor.

## 2018-11-05 ENCOUNTER — OFFICE VISIT (OUTPATIENT)
Dept: INTERNAL MEDICINE CLINIC | Age: 43
End: 2018-11-05

## 2018-11-05 VITALS
WEIGHT: 221 LBS | HEIGHT: 69 IN | RESPIRATION RATE: 16 BRPM | TEMPERATURE: 97.5 F | OXYGEN SATURATION: 98 % | DIASTOLIC BLOOD PRESSURE: 80 MMHG | BODY MASS INDEX: 32.73 KG/M2 | HEART RATE: 66 BPM | SYSTOLIC BLOOD PRESSURE: 110 MMHG

## 2018-11-05 DIAGNOSIS — R07.9 CHEST PAIN, UNSPECIFIED TYPE: Primary | ICD-10-CM

## 2018-11-05 DIAGNOSIS — F90.9 ATTENTION DEFICIT HYPERACTIVITY DISORDER (ADHD), UNSPECIFIED ADHD TYPE: ICD-10-CM

## 2018-11-05 DIAGNOSIS — L57.0 ACTINIC KERATOSES: ICD-10-CM

## 2018-11-05 DIAGNOSIS — T63.441A ALLERGIC REACTION TO BEE STING: ICD-10-CM

## 2018-11-05 RX ORDER — EPINEPHRINE 0.15 MG/.3ML
0.15 INJECTION INTRAMUSCULAR
Qty: 1 SYRINGE | Refills: 4 | Status: SHIPPED | OUTPATIENT
Start: 2018-11-05 | End: 2018-11-05

## 2018-11-05 NOTE — PROGRESS NOTES
Subjective:     Chief Complaint   Patient presents with    Medication Evaluation    Other     chest tightness    Skin Problem     left cheek     He  is a 37y.o. year old male who presents for evaluation. FH of heart disease. Has had chest tightness for the past two weeks. Wants to see a Cardiologist.  Had been on Adderall since teenage and wants to try Vyvanse instead. Took 40 mg and that worked well for him. Has two spots on left cheek region. Has a history of allergic reaction to bee sting and needs his Epi-Pen renewed. Historical Data    Past Medical History:   Diagnosis Date    ADHD (attention deficit hyperactivity disorder)     Asthma         No past surgical history on file. Outpatient Encounter Medications as of 11/5/2018   Medication Sig Dispense Refill    [DISCONTINUED] dextroamphetamine-amphetamine (ADDERALL) 20 mg tablet Take 1 Tab (20 mg total) by mouth dailyEarliest Fill Date: 6/5/18. Max Daily Amount: 20 mg 30 Tab 0     No facility-administered encounter medications on file as of 11/5/2018.          Allergies   Allergen Reactions    Bee Sting [Sting, Bee] Shortness of Breath and Swelling        Social History     Socioeconomic History    Marital status:      Spouse name: Not on file    Number of children: Not on file    Years of education: Not on file    Highest education level: Not on file   Social Needs    Financial resource strain: Not on file    Food insecurity - worry: Not on file    Food insecurity - inability: Not on file    Transportation needs - medical: Not on file   [employer] needs - non-medical: Not on file   Occupational History    Not on file   Tobacco Use    Smoking status: Light Tobacco Smoker     Types: Cigarettes    Smokeless tobacco: Never Used   Substance and Sexual Activity    Alcohol use: Not on file     Comment: socially    Drug use: No    Sexual activity: Yes     Partners: Female   Other Topics Concern    Not on file   Social History Narrative    Not on file        Review of Systems  Pertinent items are noted in HPI. Objective:     Vitals:    11/05/18 1040   BP: 110/80   Pulse: 66   Resp: 16   Temp: 97.5 °F (36.4 °C)   SpO2: 98%   Weight: 221 lb (100.2 kg)   Height: 5' 9\" (1.753 m)     Pleasant WM. Skin:  Left cheek with two probable AK's  Neck: Supple. No masses. Cardiac:  RRR without murmurs, gallops or rubs. Lungs: Clear to auscultation. Abdomen: Soft and non-tender. No masses. Extremities: No edema  Neuro: Intact  EKG:  No ST-T wave abnormalities. ASSESSMENT / PLAN:   1. Chest pain, unspecified type  · Refer to Cardiologist.  - AMB POC EKG ROUTINE W/ 12 LEADS, INTER & REP  - REFERRAL TO CARDIOLOGY    2. Actinic keratoses    - REFERRAL TO DERMATOLOGY    3. Attention deficit hyperactivity disorder (ADHD), unspecified ADHD type  · Per patients wishes, will start on Vyvanse  - Lisdexamfetamine (VYVANSE) 40 mg capsule; Take 1 Cap (40 mg total) by mouth daily. Max Daily Amount: 40 mg  Dispense: 30 Cap; Refill: 0    4. Allergic reaction to bee sting  · Epi-Pen renewed    Patient Instructions   See Cardiologist for further evaluation. See Dermatologist for treatment of spots on face. Follow a Mediterranean style diet. Start Vyvanse 40 mg once daily. Learning About the [address] [facility] [employer] Diet  What is the Mediterranean diet? The Mediterranean diet is a style of eating rather than a diet plan. It features foods eaten in [place], [place], [place] and [place], and other countries along the [place]. It emphasizes eating foods like fish, fruits, vegetables, beans, high-fiber breads and whole grains, nuts, and olive oil. This style of eating includes limited red meat, cheese, and sweets. Why choose the Mediterranean diet? A Mediterranean-style diet may improve heart health. It contains more fat than other heart-healthy diets.  But the fats are mainly from nuts, unsaturated oils (such as fish oils and olive oil), and certain nut or seed oils (such as canola, soybean, or flaxseed oil). These fats may help protect the heart and blood vessels. How can you get started on the Mediterranean diet? Here are some things you can do to switch to a more Mediterranean way of eating. What to eat  · Eat a variety of fruits and vegetables each day, such as grapes, blueberries, tomatoes, broccoli, peppers, figs, olives, spinach, eggplant, beans, lentils, and chickpeas. · Eat a variety of whole-grain foods each day, such as oats, brown rice, and whole wheat bread, pasta, and couscous. · Eat fish at least 2 times a week. Try tuna, salmon, mackerel, lake trout, herring, or sardines. · Eat moderate amounts of low-fat dairy products, such as milk, cheese, or yogurt. · Eat moderate amounts of poultry and eggs. · Choose healthy (unsaturated) fats, such as nuts, olive oil, and certain nut or seed oils like canola, soybean, and flaxseed. · Limit unhealthy (saturated) fats, such as butter, palm oil, and coconut oil. And limit fats found in animal products, such as meat and dairy products made with whole milk. Try to eat red meat only a few times a month in very small amounts. · Limit sweets and desserts to only a few times a week. This includes sugar-sweetened drinks like soda. The Mediterranean diet may also include red wine with your meal--1 glass each day for women and up to 2 glasses a day for men. Tips for eating at home  · Use herbs, spices, garlic, lemon zest, and citrus juice instead of salt to add flavor to foods. · Add avocado slices to your sandwich instead of villafana. · Have fish for lunch or dinner instead of red meat. Brush the fish with olive oil, and broil or grill it. · Sprinkle your salad with seeds or nuts instead of cheese. · Cook with olive or canola oil instead of butter or oils that are high in saturated fat. · Switch from 2% milk or whole milk to 1% or fat-free milk.   · Dip raw vegetables in a vinaigrette dressing or hummus instead of dips made from mayonnaise or sour cream.  · Have a piece of fruit for dessert instead of a piece of cake. Try baked apples, or have some dried fruit. Tips for eating out  · Try broiled, grilled, baked, or poached fish instead of having it fried or breaded. · Ask your  to have your meals prepared with olive oil instead of butter. · Order dishes made with marinara sauce or sauces made from olive oil. Avoid sauces made from cream or mayonnaise. · Choose whole-grain breads, whole wheat pasta and pizza crust, brown rice, beans, and lentils. · Cut back on butter or margarine on bread. Instead, you can dip your bread in a small amount of olive oil. · Ask for a side salad or grilled vegetables instead of french fries or chips. Where can you learn more? Go to http://[facility] Centeryelitza.info/. Enter 235-395-9639 in the search box to learn more about \"Learning About the Mediterranean Diet. \"  Current as of: March 29, 2018  Content Version: 11.8  © 5626-6739 [employer]. Care instructions adapted under license by [employer] (which disclaims liability or warranty for this information). If you have questions about a medical condition or this instruction, always ask your healthcare professional. [place] [address] any warranty or liability for your use of this information. Follow-up Disposition:  Return if symptoms worsen or fail to improve. Advised him to call back or return to office if symptoms worsen/change/persist.  Discussed expected course/resolution/complications of diagnosis in detail with patient. Medication risks/benefits/costs/interactions/alternatives discussed with patient. He was given an after visit summary which includes diagnoses, current medications, & vitals. He expressed understanding with the diagnosis and plan.

## 2018-11-05 NOTE — PATIENT INSTRUCTIONS
See Cardiologist for further evaluation. See Dermatologist for treatment of spots on face. Follow a Mediterranean style diet. Start Vyvanse 40 mg once daily. Learning About the [address] Diet  What is the Mediterranean diet? The Mediterranean diet is a style of eating rather than a diet plan. It features foods eaten in [place], [place], [place] and [place], and other countries along the [facility]. It emphasizes eating foods like fish, fruits, vegetables, beans, high-fiber breads and whole grains, nuts, and olive oil. This style of eating includes limited red meat, cheese, and sweets. Why choose the Mediterranean diet? A Mediterranean-style diet may improve heart health. It contains more fat than other heart-healthy diets. But the fats are mainly from nuts, unsaturated oils (such as fish oils and olive oil), and certain nut or seed oils (such as canola, soybean, or flaxseed oil). These fats may help protect the heart and blood vessels. How can you get started on the Mediterranean diet? Here are some things you can do to switch to a more Mediterranean way of eating. What to eat  · Eat a variety of fruits and vegetables each day, such as grapes, blueberries, tomatoes, broccoli, peppers, figs, olives, spinach, eggplant, beans, lentils, and chickpeas. · Eat a variety of whole-grain foods each day, such as oats, brown rice, and whole wheat bread, pasta, and couscous. · Eat fish at least 2 times a week. Try tuna, salmon, mackerel, lake trout, herring, or sardines. · Eat moderate amounts of low-fat dairy products, such as milk, cheese, or yogurt. · Eat moderate amounts of poultry and eggs. · Choose healthy (unsaturated) fats, such as nuts, olive oil, and certain nut or seed oils like canola, soybean, and flaxseed. · Limit unhealthy (saturated) fats, such as butter, palm oil, and coconut oil. And limit fats found in animal products, such as meat and dairy products made with whole milk. Try to eat red meat only a few times a month in very small amounts. · Limit sweets and desserts to only a few times a week. This includes sugar-sweetened drinks like soda. The Mediterranean diet may also include red wine with your meal--1 glass each day for women and up to 2 glasses a day for men. Tips for eating at home  · Use herbs, spices, garlic, lemon zest, and citrus juice instead of salt to add flavor to foods. · Add avocado slices to your sandwich instead of villafana. · Have fish for lunch or dinner instead of red meat. Brush the fish with olive oil, and broil or grill it. · Sprinkle your salad with seeds or nuts instead of cheese. · Cook with olive or canola oil instead of butter or oils that are high in saturated fat. · Switch from 2% milk or whole milk to 1% or fat-free milk. · Dip raw vegetables in a vinaigrette dressing or hummus instead of dips made from mayonnaise or sour cream.  · Have a piece of fruit for dessert instead of a piece of cake. Try baked apples, or have some dried fruit. Tips for eating out  · Try broiled, grilled, baked, or poached fish instead of having it fried or breaded. · Ask your  to have your meals prepared with olive oil instead of butter. · Order dishes made with marinara sauce or sauces made from olive oil. Avoid sauces made from cream or mayonnaise. · Choose whole-grain breads, whole wheat pasta and pizza crust, brown rice, beans, and lentils. · Cut back on butter or margarine on bread. Instead, you can dip your bread in a small amount of olive oil. · Ask for a side salad or grilled vegetables instead of french fries or chips. Where can you learn more? Go to http://dayton-yelitza.info/. Enter [phone] in the search box to learn more about \"Learning About the Mediterranean Diet. \"  Current as of: March 29, 2018  Content Version: 11.8  © 8120-6329 [employer].  Care instructions adapted under license by [employer] (which disclaims liability or warranty for this information). If you have questions about a medical condition or this instruction, always ask your healthcare professional. [address] any warranty or liability for your use of this information.

## 2020-04-20 NOTE — PROGRESS NOTES
Chief Complaint   Patient presents with    Chest Pain     Verified patient with two types of identifiers. .lgameds  Verified patient's pharmacy     Per WILIAM Baig discontinued all medications not taken. W Plasty Text: The lesion was extirpated to the level of the fat with a #15 scalpel blade.  Given the location of the defect, shape of the defect and the proximity to free margins a W-plasty was deemed most appropriate for repair.  Using a sterile surgical marker, the appropriate transposition arms of the W-plasty were drawn incorporating the defect and placing the expected incisions within the relaxed skin tension lines where possible.    The area thus outlined was incised deep to adipose tissue with a #15 scalpel blade.  The skin margins were undermined to an appropriate distance in all directions utilizing iris scissors.  The opposing transposition arms were then transposed into place in opposite direction and anchored with interrupted buried subcutaneous sutures.